# Patient Record
Sex: FEMALE | URBAN - METROPOLITAN AREA
[De-identification: names, ages, dates, MRNs, and addresses within clinical notes are randomized per-mention and may not be internally consistent; named-entity substitution may affect disease eponyms.]

---

## 2022-07-19 ENCOUNTER — APPOINTMENT (RX ONLY)
Dept: URBAN - METROPOLITAN AREA CLINIC 55 | Facility: CLINIC | Age: 57
Setting detail: DERMATOLOGY
End: 2022-07-19

## 2022-07-19 DIAGNOSIS — Z41.9 ENCOUNTER FOR PROCEDURE FOR PURPOSES OTHER THAN REMEDYING HEALTH STATE, UNSPECIFIED: ICD-10-CM

## 2022-07-19 PROCEDURE — ? COSMETIC CONSULTATION: FILLERS

## 2022-11-03 ENCOUNTER — APPOINTMENT (RX ONLY)
Dept: URBAN - METROPOLITAN AREA CLINIC 55 | Facility: CLINIC | Age: 57
Setting detail: DERMATOLOGY
End: 2022-11-03

## 2022-11-03 DIAGNOSIS — Z41.9 ENCOUNTER FOR PROCEDURE FOR PURPOSES OTHER THAN REMEDYING HEALTH STATE, UNSPECIFIED: ICD-10-CM

## 2022-11-03 PROCEDURE — ? SCULPTRA

## 2022-11-03 NOTE — PROCEDURE: SCULPTRA
Show Forehead Volume?: Yes
Additional Area 5 Volume In Cc: 0
Use Map Statement For Sites (Optional): No
Vials Reconstituted (Required For Inventory): 1
Anesthesia Type: 1% lidocaine with epinephrine
Dilution Method: The Sculptra was reconstituted with 8 ml of sterile water and 2cc 2% plain lidocaine for each vial.
Anesthesia Volume In Cc: 0.5
Injection Technique: The Sculptra was injected to the areas shown in black with a 25g cannula after prepping the skin with alcohol and /or chlorhexidine and providing appropriate anesthesia.
Consent: Written consent obtained.
Detail Level: Detailed
Show Inventory Tab: Show
Post-Care Instructions: After care instructions were provided to the patient.

## 2022-12-15 ENCOUNTER — APPOINTMENT (RX ONLY)
Dept: URBAN - METROPOLITAN AREA CLINIC 55 | Facility: CLINIC | Age: 57
Setting detail: DERMATOLOGY
End: 2022-12-15

## 2022-12-15 DIAGNOSIS — Z41.9 ENCOUNTER FOR PROCEDURE FOR PURPOSES OTHER THAN REMEDYING HEALTH STATE, UNSPECIFIED: ICD-10-CM

## 2022-12-15 PROCEDURE — ? SCULPTRA

## 2022-12-15 NOTE — PROCEDURE: SCULPTRA
Nasolabial Folds Filler Volume In Cc: 0
Show Third Additional Location?: Yes
Dilution Method: The Sculptra was reconstituted with 8 ml of sterile water and 2cc 2% plain lidocaine for each vial.
Show Inventory Tab: Show
Show Right And Left Middle Malar Volume?: No
Consent: Written consent obtained.
Injection Technique: The Sculptra was injected to the areas shown in black with a 25g cannula after prepping the skin with alcohol and /or chlorhexidine and providing appropriate anesthesia.
Vials Reconstituted (Required For Inventory): 1
Anesthesia Type: 1% lidocaine with epinephrine
Treatment Number: 2
Post-Care Instructions: After care instructions were provided to the patient.
Anesthesia Volume In Cc: 0.5
Detail Level: Detailed

## 2023-01-31 ENCOUNTER — APPOINTMENT (RX ONLY)
Dept: URBAN - METROPOLITAN AREA CLINIC 55 | Facility: CLINIC | Age: 58
Setting detail: DERMATOLOGY
End: 2023-01-31

## 2023-01-31 DIAGNOSIS — Z41.9 ENCOUNTER FOR PROCEDURE FOR PURPOSES OTHER THAN REMEDYING HEALTH STATE, UNSPECIFIED: ICD-10-CM

## 2023-01-31 PROCEDURE — ? INVENTORY

## 2023-01-31 PROCEDURE — ? COSMETIC CONSULTATION: GENERAL

## 2023-01-31 PROCEDURE — ? SCULPTRA

## 2023-01-31 NOTE — PROCEDURE: SCULPTRA
Detail Level: Detailed
Show Depth Variable?: Yes
Right Cheek Filler Volume In Cc: 0
Show Right And Left Dorsal Hands Volume?: No
Post-Care Instructions: After care instructions were provided to the patient.
Show Inventory Tab: Show
Vials Reconstituted (Required For Inventory): 1
Anesthesia Type: 1% lidocaine with epinephrine
Dilution Method: The Sculptra was reconstituted with 8 ml of sterile water and 2cc 2% plain lidocaine for each vial.
Treatment Number: 3
Injection Technique: The Sculptra was injected to the areas shown in black with a 25g cannula after prepping the skin with alcohol and /or chlorhexidine and providing appropriate anesthesia.
Anesthesia Volume In Cc: 0.5
Consent: Written consent obtained.

## 2023-12-07 ENCOUNTER — APPOINTMENT (RX ONLY)
Dept: URBAN - METROPOLITAN AREA CLINIC 55 | Facility: CLINIC | Age: 58
Setting detail: DERMATOLOGY
End: 2023-12-07

## 2023-12-07 DIAGNOSIS — Z41.9 ENCOUNTER FOR PROCEDURE FOR PURPOSES OTHER THAN REMEDYING HEALTH STATE, UNSPECIFIED: ICD-10-CM

## 2023-12-07 PROCEDURE — ? FILLERS

## 2023-12-07 PROCEDURE — ? SCULPTRA

## 2023-12-07 PROCEDURE — ? INVENTORY

## 2023-12-07 NOTE — PROCEDURE: FILLERS
Additional Area 4 Volume In Cc: 0
Anesthesia Volume In Cc: 0.5
Include Cannula Size?: 27G
Include Cannula Information In Note?: No
Consent was obtained.
Detail Level: Detailed
Post-Care Instructions: After care instructions were provided verbally and in writing.
Filler: RHA 3
Map Statment: See Attach Map for Complete Details
Anesthesia Type: 1% lidocaine with epinephrine
Include Cannula Information In Note?: Yes

## 2023-12-07 NOTE — PROCEDURE: SCULPTRA
Show Right And Left Lateral Face Volume?: No
Left Jawline Filler Volume In Cc: 0
Vials Reconstituted (Required For Inventory): 1
Show First Additional Location?: Yes
Anesthesia Type: 1% lidocaine with epinephrine
Treatment Number: 4
Dilution Method: The Sculptra was reconstituted with 8 ml of sterile water and 2cc 2% plain lidocaine for each vial.
Injection Technique: The Sculptra was injected to the areas shown in black with a 25g cannula after prepping the skin with alcohol and /or chlorhexidine and providing appropriate anesthesia.
Detail Level: Detailed
Anesthesia Volume In Cc: 0.5
Consent: Written consent obtained.
Show Inventory Tab: Show
Post-Care Instructions: After care instructions were provided to the patient.

## 2023-12-07 NOTE — PROCEDURE: SCULPTRA
Mental Crease Filler Volume In Cc: 0
Show Local Anesthesia?: Yes
Detail Level: Detailed
Anesthesia Type: 1% lidocaine with epinephrine
Show Right And Left Nasolabial Fold Volume?: No
Vials Reconstituted (Required For Inventory): 1
Dilution Method: The Sculptra was reconstituted with 8 ml of sterile water and 2cc 2% plain lidocaine for each vial.
Anesthesia Volume In Cc: 0.5
Show Inventory Tab: Show
Injection Technique: The Sculptra was injected to the areas shown in black with a 25g cannula after prepping the skin with alcohol and /or chlorhexidine and providing appropriate anesthesia.
Consent: Written consent obtained.
Post-Care Instructions: After care instructions were provided to the patient.

## 2023-12-07 NOTE — PROCEDURE: FILLERS
Additional Area 2 Volume In Cc: 0
Use Map Statement For Sites (Optional): No
Filler: RHA 3
Consent was obtained.
Map Statment: See Attach Map for Complete Details
Post-Care Instructions: After care instructions were provided verbally and in writing.
Anesthesia Type: 1% lidocaine with epinephrine
Include Cannula Information In Note?: Yes
Anesthesia Volume In Cc: 0.5
Include Cannula Size?: 27G
Include Cannula Size?: 25G
Detail Level: Detailed

## 2024-04-26 ENCOUNTER — APPOINTMENT (RX ONLY)
Dept: URBAN - METROPOLITAN AREA CLINIC 55 | Facility: CLINIC | Age: 59
Setting detail: DERMATOLOGY
End: 2024-04-26

## 2024-04-26 DIAGNOSIS — Z41.9 ENCOUNTER FOR PROCEDURE FOR PURPOSES OTHER THAN REMEDYING HEALTH STATE, UNSPECIFIED: ICD-10-CM

## 2024-04-26 PROCEDURE — ? FILLERS

## 2024-04-26 PROCEDURE — ? INVENTORY

## 2024-04-26 PROCEDURE — ? SCULPTRA

## 2024-04-26 NOTE — PROCEDURE: FILLERS
Decollete Filler Volume In Cc: 0
Use Map Statement For Sites (Optional): No
Include Cannula Size?: 27G
Filler: RHA Redensity
Map Statment: See Attach Map for Complete Details
Anesthesia Type: 1% lidocaine with epinephrine
Post-Care Instructions: After care instructions were provided verbally and in writing.
Consent was obtained.
Anesthesia Volume In Cc: 0.5
Include Cannula Information In Note?: Yes
Detail Level: Detailed

## 2024-04-26 NOTE — PROCEDURE: SCULPTRA
Left Buccal Filler Volume In Cc: 0
Show Right And Left Forehead Volume?: No
Show Second Additional Location?: Yes
Post-Care Instructions: After care instructions were provided to the patient.
Treatment Number: 5
Vials Reconstituted (Required For Inventory): 1
Detail Level: Detailed
Anesthesia Type: 1% lidocaine with epinephrine
Dilution Method: The Sculptra was reconstituted with 8 ml of sterile water and 2cc 2% plain lidocaine for each vial.
Injection Technique: The Sculptra was injected to the areas shown in black with a 25g cannula after prepping the skin with alcohol and /or chlorhexidine and providing appropriate anesthesia.
Anesthesia Volume In Cc: 0.5
Show Inventory Tab: Show
Consent: Written consent obtained.

## 2025-04-16 ENCOUNTER — APPOINTMENT (OUTPATIENT)
Dept: URBAN - METROPOLITAN AREA CLINIC 55 | Facility: CLINIC | Age: 60
Setting detail: DERMATOLOGY
End: 2025-04-16

## 2025-04-16 DIAGNOSIS — Z41.9 ENCOUNTER FOR PROCEDURE FOR PURPOSES OTHER THAN REMEDYING HEALTH STATE, UNSPECIFIED: ICD-10-CM

## 2025-04-16 PROCEDURE — ? SCULPTRA

## 2025-04-16 PROCEDURE — ? FILLERS

## 2025-04-16 PROCEDURE — ? INVENTORY

## 2025-04-16 NOTE — PROCEDURE: SCULPTRA
Show Zygomatic Arches Volume?: Yes
Dorsal Hands Sculptra Filler Volume In Cc: 0
Show Right And Left Buccal Volume?: No
Show Inventory Tab: Show
Post-Care Instructions: After care instructions were provided to the patient.
Dilution Method: The Sculptra was reconstituted with 8 ml of sterile water and 2cc 2% plain lidocaine for each vial.
Anesthesia Type: 1% lidocaine with epinephrine
Anesthesia Volume In Cc: 0.5
Injection Technique: The Sculptra was injected to the areas shown in black with a 25g cannula after prepping the skin with alcohol and /or chlorhexidine and providing appropriate anesthesia.
Detail Level: Detailed
Vials Reconstituted (Required For Inventory): 1
Consent: Written consent obtained.

## 2025-04-16 NOTE — PROCEDURE: FILLERS
Marionette Lines Filler Volume In Cc: 0
Include Cannula Information In Note?: Yes
Include Cannula Information In Note?: No
Anesthesia Type: 1% lidocaine with epinephrine
Anesthesia Volume In Cc: 0.5
Include Cannula Size?: 27G
Include Cannula Size?: 25G
Filler: Restylane Defyne
Map Statment: See Attach Map for Complete Details
Detail Level: Detailed
Consent was obtained.
Post-Care Instructions: After care instructions were provided verbally and in writing.